# Patient Record
Sex: FEMALE | Race: AMERICAN INDIAN OR ALASKA NATIVE | Employment: OTHER | URBAN - METROPOLITAN AREA
[De-identification: names, ages, dates, MRNs, and addresses within clinical notes are randomized per-mention and may not be internally consistent; named-entity substitution may affect disease eponyms.]

---

## 2022-11-17 LAB
AVERAGE GLUCOSE: NORMAL
HBA1C MFR BLD: 5.4 %

## 2023-01-25 ENCOUNTER — HOSPITAL ENCOUNTER (OUTPATIENT)
Dept: MAMMOGRAPHY | Age: 73
Discharge: HOME | End: 2023-01-28
Payer: MEDICARE

## 2023-01-25 DIAGNOSIS — Z12.31 ENCOUNTER FOR SCREENING MAMMOGRAM FOR BREAST CANCER: ICD-10-CM

## 2023-01-25 DIAGNOSIS — Z78.0 ASYMPTOMATIC MENOPAUSE: ICD-10-CM

## 2023-01-25 PROCEDURE — 77080 DXA BONE DENSITY AXIAL: CPT

## 2023-01-25 PROCEDURE — 77063 BREAST TOMOSYNTHESIS BI: CPT

## 2023-01-27 ENCOUNTER — OFFICE VISIT (OUTPATIENT)
Dept: UROLOGY | Age: 73
End: 2023-01-27

## 2023-01-27 DIAGNOSIS — N39.0 URINARY TRACT INFECTION WITHOUT HEMATURIA, SITE UNSPECIFIED: ICD-10-CM

## 2023-01-27 DIAGNOSIS — R32 INCONTINENCE IN FEMALE: Primary | ICD-10-CM

## 2023-01-27 LAB
BILIRUBIN, URINE, POC: NORMAL
BLOOD URINE, POC: NORMAL
GLUCOSE URINE, POC: NEGATIVE
KETONES, URINE, POC: 15
LEUKOCYTE ESTERASE, URINE, POC: NORMAL
NITRITE, URINE, POC: POSITIVE
PH, URINE, POC: 5.5 (ref 4.6–8)
PROTEIN,URINE, POC: 100
PVR, POC: 122 CC
SPECIFIC GRAVITY, URINE, POC: 1.02 (ref 1–1.03)
URINALYSIS CLARITY, POC: NORMAL
URINALYSIS COLOR, POC: NORMAL
UROBILINOGEN, POC: NORMAL

## 2023-01-27 RX ORDER — AMLODIPINE BESYLATE 5 MG/1
5 TABLET ORAL
COMMUNITY
Start: 2017-12-01

## 2023-01-27 RX ORDER — TIOTROPIUM BROMIDE AND OLODATEROL 3.124; 2.736 UG/1; UG/1
SPRAY, METERED RESPIRATORY (INHALATION)
COMMUNITY
Start: 2022-11-22

## 2023-01-27 RX ORDER — METOPROLOL SUCCINATE 50 MG/1
50 TABLET, EXTENDED RELEASE ORAL
COMMUNITY
Start: 2019-02-16

## 2023-01-27 RX ORDER — OMEPRAZOLE 10 MG/1
10 CAPSULE, DELAYED RELEASE ORAL DAILY
COMMUNITY

## 2023-01-27 RX ORDER — NITROFURANTOIN 25; 75 MG/1; MG/1
100 CAPSULE ORAL 2 TIMES DAILY
Qty: 14 CAPSULE | Refills: 0 | Status: SHIPPED | OUTPATIENT
Start: 2023-01-27 | End: 2023-02-03

## 2023-01-27 RX ORDER — ASPIRIN 81 MG/1
81 TABLET ORAL
COMMUNITY
Start: 2019-05-29

## 2023-01-27 RX ORDER — MULTIVIT-MIN/IRON/FOLIC ACID/K 18-600-40
CAPSULE ORAL
COMMUNITY

## 2023-01-27 RX ORDER — BENAZEPRIL HYDROCHLORIDE 40 MG/1
40 TABLET, FILM COATED ORAL
COMMUNITY
Start: 2017-10-20

## 2023-01-27 RX ORDER — ALLOPURINOL 300 MG/1
300 TABLET ORAL
COMMUNITY
Start: 2018-01-08

## 2023-01-27 RX ORDER — ATORVASTATIN CALCIUM 20 MG/1
20 TABLET, FILM COATED ORAL
COMMUNITY
Start: 2017-12-09

## 2023-01-27 ASSESSMENT — ENCOUNTER SYMPTOMS
RESPIRATORY NEGATIVE: 1
BACK PAIN: 1
EYES NEGATIVE: 1

## 2023-01-27 NOTE — PROGRESS NOTES
[place] [address]  [phone]          Damien Scott  : 1950    Chief Complaint   Patient presents with    Incontinence          HPI     Damien Scott is a 74 y.o. female  Referred by primary care physician due to ongoing urinary leakage. Patient reports over the last couple years her urinary leakage has worsened. She has significant history of neuropathy. The neuropathy is present but there is no known cause for it. She is not a diabetic. She 1 time was using a cane and rolling walker but she is now wheelchair-bound. She is able to stand and transfer but not able to walk long distances. She reports she feels that a lot of the incontinence is due to her immobility to get to the restroom quick enough. She has started doing timed voids hoping that this will help some and she has seen mild improvement. She wears 3-4 thick pads a day. Those are typically wet when she changes them. She denies any significant stress urinary incontinence. She has significant history of 1 pregnancy with vaginal delivery. Denies any significant history of infections. To her knowledge there is no family history of bladder or kidney issues within the family. She is not currently a smoker but she does have significant history of smoking she smoked for a total of 40 years.     Past Medical History:   Diagnosis Date    Heart disease     High cholesterol     Hypertension      Past Surgical History:   Procedure Laterality Date    JOINT REPLACEMENT      OTHER SURGICAL HISTORY      lumbar    TUBAL LIGATION       Current Outpatient Medications   Medication Sig Dispense Refill    metoprolol succinate (TOPROL XL) 50 MG extended release tablet 50 mg      allopurinol (ZYLOPRIM) 300 MG tablet 300 mg      benazepril (LOTENSIN) 40 MG tablet 40 mg      amLODIPine (NORVASC) 5 MG tablet 5 mg      aspirin 81 MG EC tablet 81 mg      atorvastatin (LIPITOR) 20 MG tablet 20 mg      STIOLTO RESPIMAT 2.5-2.5 MCG/ACT AERS       NONFORMULARY Doxazosin 20 mg      omeprazole (PRILOSEC) 10 MG delayed release capsule Take 10 mg by mouth daily      simethicone (MYLICON) 40 MO/0.7WV LIQD drops Take 40 mg by mouth every 6 hours as needed      Multiple Vitamins-Minerals (VISION-NICO PRESERVE PO) Take by mouth      Cholecalciferol (VITAMIN D) 50 MCG (2000 UT) CAPS capsule Take by mouth      nitrofurantoin, macrocrystal-monohydrate, (MACROBID) 100 MG capsule Take 1 capsule by mouth 2 times daily for 7 days 14 capsule 0     No current facility-administered medications for this visit. No Known Allergies  Social History     Socioeconomic History    Marital status:      Spouse name: Not on file    Number of children: Not on file    Years of education: Not on file    Highest education level: Not on file   Occupational History    Not on file   Tobacco Use    Smoking status: Former     Types: Cigarettes    Smokeless tobacco: Never   Substance and Sexual Activity    Alcohol use: Yes    Drug use: Not on file    Sexual activity: Not on file   Other Topics Concern    Not on file   Social History Narrative    Not on file     Social Determinants of Health     Financial Resource Strain: Not on file   Food Insecurity: Not on file   Transportation Needs: Not on file   Physical Activity: Not on file   Stress: Not on file   Social Connections: Not on file   Intimate Partner Violence: Not on file   Housing Stability: Not on file     Family History   Problem Relation Age of Onset    Heart Disease Father     Heart Disease Mother     Breast Cancer Neg Hx        Review of Systems  Constitutional: Positive for fever. Skin: Negative skin ROS  Eyes: Eyes negative  ENT: Positive for dry mouth. Respiratory: Respiratory negative  Cardiovascular: Positive for hypertension, leg pain and varicose veins. GI: Neg GI ROS  Genitourinary: Positive for nocturia, urgency and leakage w/ urge. Number of pregnancies: 1.   Number of births: 1.  Musculoskeletal: Positive for back pain and arthralgias. Neurological: Positive for numbness. Psychological: Neg psych ROS  Endocrine: Endocrine negative  Hem/Lymphatic: Positive for easy bruising. Urinalysis  UA - Dipstick  Results for orders placed or performed in visit on 01/27/23   AMB POC URINALYSIS DIP STICK AUTO W/O MICRO   Result Value Ref Range    Color (UA POC)      Clarity (UA POC)      Glucose, Urine, POC Negative Negative    Bilirubin, Urine, POC Small Negative    KETONES, Urine, POC 15 Negative    Specific Gravity, Urine, POC 1.020 1.001 - 1.035    Blood (UA POC) Moderate Negative    pH, Urine, POC 5.5 4.6 - 8.0    Protein, Urine,  Negative    Urobilinogen, POC 1 mg/dL     Nitrite, Urine, POC Positive Negative    Leukocyte Esterase, Urine, POC Moderate Negative     PHYSICAL EXAM    GENERAL: No acute distress, Awake, Alert, Oriented X 3, Gait normal  ABDOMEN: soft, non tender, non-distended, positive bowel sounds, no organomegaly, no palpable masses, no guarding, no rebound tenderness  SKIN: No rash, no erythema, no lacerations or abrasions, no ecchymosis  MUSCULOSKELETAL - BLE weakness. She is in W/C. PVR- 122ml    Assessment and Plan    ICD-10-CM    1. Incontinence in female  R32 AMB POC URINALYSIS DIP STICK AUTO W/O MICRO     Culture, Urine     AMB POC PVR, REESE,POST-VOID RES,US,NON-IMAGING      2. Urinary tract infection without hematuria, site unspecified  N39.0 Culture, Urine     AMB POC PVR, REESE,POST-VOID RES,US,NON-IMAGING        Urinary urgency/urge incontinence  The urgency is most certainly worse due to physical restraints. She has started doing timed voids. She was given samples of Myrbetriq. Side effect profile of new medication discussed. UTI-  Urine culture sent. She has not ever had a urinary infection to her knowledge. We will add Macrobid BID for 7 days. Follow up in 6 weeks for urine recheck and also to assess symptoms.      Diamond Carrion - NP   Ayush Dubon is supervising physician today and he approves plan of care. Return in about 6 weeks (around 3/10/2023) for for recheck. Elements of this note have been dictated using speech recognition software. Although reviewed, errors of speech recognition may have occurred.

## 2023-01-29 LAB
BACTERIA SPEC CULT: ABNORMAL
BACTERIA SPEC CULT: ABNORMAL
SERVICE CMNT-IMP: ABNORMAL

## 2023-02-01 ENCOUNTER — INITIAL CONSULT (OUTPATIENT)
Dept: CARDIOLOGY CLINIC | Age: 73
End: 2023-02-01
Payer: MEDICARE

## 2023-02-01 VITALS — HEART RATE: 76 BPM | WEIGHT: 154 LBS | SYSTOLIC BLOOD PRESSURE: 148 MMHG | DIASTOLIC BLOOD PRESSURE: 88 MMHG

## 2023-02-01 DIAGNOSIS — M47.819 SPONDYLOARTHROPATHY: ICD-10-CM

## 2023-02-01 DIAGNOSIS — I10 PRIMARY HYPERTENSION: Primary | ICD-10-CM

## 2023-02-01 DIAGNOSIS — I65.29 ASYMPTOMATIC CAROTID ARTERY STENOSIS WITHOUT INFARCTION, UNSPECIFIED LATERALITY: ICD-10-CM

## 2023-02-01 PROCEDURE — 99203 OFFICE O/P NEW LOW 30 MIN: CPT | Performed by: INTERNAL MEDICINE

## 2023-02-01 PROCEDURE — 93000 ELECTROCARDIOGRAM COMPLETE: CPT | Performed by: INTERNAL MEDICINE

## 2023-02-01 PROCEDURE — 3077F SYST BP >= 140 MM HG: CPT | Performed by: INTERNAL MEDICINE

## 2023-02-01 PROCEDURE — 1123F ACP DISCUSS/DSCN MKR DOCD: CPT | Performed by: INTERNAL MEDICINE

## 2023-02-01 PROCEDURE — 3079F DIAST BP 80-89 MM HG: CPT | Performed by: INTERNAL MEDICINE

## 2023-02-01 RX ORDER — GABAPENTIN 100 MG/1
CAPSULE ORAL
COMMUNITY
Start: 2022-11-29 | End: 2023-02-01 | Stop reason: DRUGHIGH

## 2023-02-01 RX ORDER — GABAPENTIN 100 MG/1
200 CAPSULE ORAL 3 TIMES DAILY
COMMUNITY
Start: 2022-11-29 | End: 2023-02-27

## 2023-02-01 NOTE — PROGRESS NOTES
[facility] CARDIOLOGY  [address], [address]  PHONE: [phone]        23        NAME:  Silvana Yun  : 1950  MRN: [id number]     CHIEF COMPLAINT:    Hypertension      SUBJECTIVE:     73 yo female referred by IM for fu HTN. BP generally well controlled. BP at home 130/70 range. Phx:  Htn  Lumbar sx   Neuropathy  MDS, low risk  Copd  Hx of annual carotid ultrasound      Medications were all reviewed with the patient today and updated as necessary. Current Outpatient Medications   Medication Sig    gabapentin (NEURONTIN) 100 MG capsule Take 200 mg by mouth 3 times daily. Cyanocobalamin (B-12 PO) Take by mouth    NIACIN-50 PO Take by mouth    metoprolol succinate (TOPROL XL) 50 MG extended release tablet 50 mg    allopurinol (ZYLOPRIM) 300 MG tablet 300 mg    benazepril (LOTENSIN) 40 MG tablet 40 mg    amLODIPine (NORVASC) 5 MG tablet 5 mg    aspirin 81 MG EC tablet 81 mg    atorvastatin (LIPITOR) 20 MG tablet 20 mg    STIOLTO RESPIMAT 2.5-2.5 MCG/ACT AERS     NONFORMULARY Doxazosin 20 mg    omeprazole (PRILOSEC) 10 MG delayed release capsule Take 10 mg by mouth daily    Multiple Vitamins-Minerals (VISION-NICO PRESERVE PO) Take by mouth    Cholecalciferol (VITAMIN D) 50 MCG ( UT) CAPS capsule Take by mouth    nitrofurantoin, macrocrystal-monohydrate, (MACROBID) 100 MG capsule Take 1 capsule by mouth 2 times daily for 7 days     No current facility-administered medications for this visit. Allergies   Allergen Reactions    Sulfa Antibiotics Other (See Comments)     Other reaction(s): GI Intolerance, Nausea and/or vomiting-Intolerance  GI UPSET  GI UPSET             PHYSICAL EXAM:     Wt Readings from Last 3 Encounters:   23 154 lb (69.9 kg)     BP Readings from Last 3 Encounters:   23 (!) 148/88       BP (!) 148/88   Pulse 76   Wt 154 lb (69.9 kg)     Physical Exam  Vitals reviewed. HENT:      Head: Normocephalic and atraumatic. Eyes:      Extraocular Movements: Extraocular movements intact. Pupils: Pupils are equal, round, and reactive to light. Cardiovascular:      Rate and Rhythm: Normal rate. Heart sounds: Normal heart sounds. Pulmonary:      Effort: Pulmonary effort is normal.      Breath sounds: Normal breath sounds. Abdominal:      General: Abdomen is flat. Palpations: Abdomen is soft. There is no mass. Musculoskeletal:         General: Normal range of motion. Cervical back: Normal range of motion. Skin:     General: Skin is warm and dry. Neurological:      General: No focal deficit present. Mental Status: She is alert and oriented to person, place, and time. Psychiatric:         Mood and Affect: Mood normal.         RECENT LABS AND RECORDS REVIEW    Sinus  Rhythm   Low voltage in precordial leads. ASSESSMENT and PLAN    Mari France was seen today for hypertension. Diagnoses and all orders for this visit:    Primary hypertension    Spondyloarthropathy    Asymptomatic carotid artery stenosis without infarction, unspecified laterality    Other orders  -     EKG 12 lead     /////    CV status is stable. Medications and most recent labs reviewed. Greater  than 50% of today's visit was devoted to counseling the patient, explaining disease concepts and offering advice and suggestions for optimal care. Return in about 1 year (around 2/1/2024).        Kait Manjarrez MD  2/1/2023  12:02 PM .

## 2023-03-10 ENCOUNTER — OFFICE VISIT (OUTPATIENT)
Dept: UROLOGY | Age: 73
End: 2023-03-10
Payer: MEDICARE

## 2023-03-10 DIAGNOSIS — K62.3 RECTAL PROLAPSE: Primary | ICD-10-CM

## 2023-03-10 DIAGNOSIS — N39.0 URINARY TRACT INFECTION WITHOUT HEMATURIA, SITE UNSPECIFIED: ICD-10-CM

## 2023-03-10 DIAGNOSIS — R32 INCONTINENCE IN FEMALE: ICD-10-CM

## 2023-03-10 PROCEDURE — 99214 OFFICE O/P EST MOD 30 MIN: CPT | Performed by: NURSE PRACTITIONER

## 2023-03-10 PROCEDURE — 1123F ACP DISCUSS/DSCN MKR DOCD: CPT | Performed by: NURSE PRACTITIONER

## 2023-03-10 NOTE — PROGRESS NOTES
[address], [address]  [phone]          Alejandro Mcbride  : 1950    Chief Complaint   Patient presents with    Follow-up          HPI     Alejandro Mcbride is a 74 y.o. female    Returns today for follow-up on urinary urgency, urge incontinence and UTI. At her last visit urine culture revealed Klebsiella. She was treated with antibiotic and the infection cleared easily. She was also given Myrbetriq 50 mg p.o. daily for the urinary urgency and incontinence. Patient tells me she has seen 90% improvement with medication. She wears 1 pad daily. She wears this mainly because of rectal prolapse and leakage. She is not having any urinary leakage. She does ask if she could be referred to colorectal for them to take a look at the rectal issues. Past Medical History:   Diagnosis Date    Heart disease     High cholesterol     Hypertension      Past Surgical History:   Procedure Laterality Date    JOINT REPLACEMENT      OTHER SURGICAL HISTORY      lumbar    TUBAL LIGATION       Current Outpatient Medications   Medication Sig Dispense Refill    mirabegron (MYRBETRIQ) 50 MG TB24 Take 50 mg by mouth daily 90 tablet 3    Cyanocobalamin (B-12 PO) Take by mouth      NIACIN-50 PO Take by mouth      metoprolol succinate (TOPROL XL) 50 MG extended release tablet 50 mg      allopurinol (ZYLOPRIM) 300 MG tablet 300 mg      benazepril (LOTENSIN) 40 MG tablet 40 mg      amLODIPine (NORVASC) 5 MG tablet 5 mg      aspirin 81 MG EC tablet 81 mg      atorvastatin (LIPITOR) 20 MG tablet 20 mg      STIOLTO RESPIMAT 2.5-2.5 MCG/ACT AERS       NONFORMULARY Doxazosin 20 mg      omeprazole (PRILOSEC) 10 MG delayed release capsule Take 10 mg by mouth daily      Multiple Vitamins-Minerals (VISION-NICO PRESERVE PO) Take by mouth      Cholecalciferol (VITAMIN D) 50 MCG (2000) CAPS capsule Take by mouth      gabapentin (NEURONTIN) 100 MG capsule Take 200 mg by mouth 3 times daily. No current facility-administered medications for this visit. Allergies   Allergen Reactions    Sulfa Antibiotics Other (See Comments)     Other reaction(s): GI Intolerance, Nausea and/or vomiting-Intolerance  GI UPSET  GI UPSET       Social History     Socioeconomic History    Marital status:      Spouse name: Not on file    Number of children: Not on file    Years of education: Not on file    Highest education level: Not on file   Occupational History    Not on file   Tobacco Use    Smoking status: Former     Types: Cigarettes    Smokeless tobacco: Never   Substance and Sexual Activity    Alcohol use: Yes    Drug use: Not on file    Sexual activity: Not on file   Other Topics Concern    Not on file   Social History Narrative    Not on file     Social Determinants of Health     Financial Resource Strain: Not on file   Food Insecurity: Not on file   Transportation Needs: Not on file   Physical Activity: Not on file   Stress: Not on file   Social Connections: Not on file   Intimate Partner Violence: Not on file   Housing Stability: Not on file     Family History   Problem Relation Age of Onset    Heart Disease Father     Heart Disease Mother     Breast Cancer Neg Hx        ROS  See HPI      GENERAL: No acute distress, Awake, Alert, Oriented X 3, Gait normal  ABDOMEN: soft, non tender, non-distended, positive bowel sounds, no organomegaly, no palpable masses, no guarding, no rebound tenderness  SKIN: No rash, no erythema, no lacerations or abrasions, no ecchymosis  MUSCULOSKELETAL - BLE weakness. She is in W/C. Assessment and Plan    ICD-10-CM    1. Rectal prolapse  K62.3 External Referral To Colorectal Surgery      2. Incontinence in female  R32       3. Urinary tract infection without hematuria, site unspecified  N39.0         UTI-  Resolved    Incontinence-  Well controlled with Myrbetriq 50 mg p.o. daily. A prescription will be [facility] to [facility]. I will see her back in 6 months.     Rectal prolapse-  A referral to [facility] has been arranged. JOSE LUIS Ross - NP  Dr. Alise Hernandez is supervising physician today and he approves plan of care. Return in about 6 months (around 9/10/2023) for for recheck. Elements of this note have been dictated using speech recognition software. Although reviewed, errors of speech recognition may have occurred. No

## 2023-09-13 ENCOUNTER — OFFICE VISIT (OUTPATIENT)
Dept: UROLOGY | Age: 73
End: 2023-09-13
Payer: MEDICARE

## 2023-09-13 DIAGNOSIS — N39.0 URINARY TRACT INFECTION WITHOUT HEMATURIA, SITE UNSPECIFIED: ICD-10-CM

## 2023-09-13 DIAGNOSIS — R32 INCONTINENCE IN FEMALE: Primary | ICD-10-CM

## 2023-09-13 LAB
BILIRUBIN, URINE, POC: NEGATIVE
BLOOD URINE, POC: NEGATIVE
GLUCOSE URINE, POC: NEGATIVE
KETONES, URINE, POC: NORMAL
LEUKOCYTE ESTERASE, URINE, POC: NEGATIVE
NITRITE, URINE, POC: NEGATIVE
PH, URINE, POC: 5 (ref 4.6–8)
PROTEIN,URINE, POC: 30
SPECIFIC GRAVITY, URINE, POC: 1.02 (ref 1–1.03)
URINALYSIS CLARITY, POC: NORMAL
URINALYSIS COLOR, POC: NORMAL
UROBILINOGEN, POC: NORMAL

## 2023-09-13 PROCEDURE — 81003 URINALYSIS AUTO W/O SCOPE: CPT | Performed by: NURSE PRACTITIONER

## 2023-09-13 PROCEDURE — 99213 OFFICE O/P EST LOW 20 MIN: CPT | Performed by: NURSE PRACTITIONER

## 2023-09-13 PROCEDURE — 1123F ACP DISCUSS/DSCN MKR DOCD: CPT | Performed by: NURSE PRACTITIONER

## 2023-09-13 ASSESSMENT — ENCOUNTER SYMPTOMS
BACK PAIN: 0
NAUSEA: 0

## 2023-09-13 NOTE — PROGRESS NOTES
[address]  [phone]          Malachi Krishnamurthy  : 1950    Chief Complaint   Patient presents with    Follow-up          HPI     Malachi Krishnamurthy is a 74 y.o. female  Returns today for follow-up on urinary urgency, urge incontinence and UTI. She has not had any urinary infections since I saw her last.  She has not needed an antibiotic. As far as urinary frequency urgency are concerned she continues to use Myrbetriq 50 mg daily. She has seen much much improvement with this medication. She actually traveled to [place] and her flight was 5 hours. She did not have to use the restroom on flight. She was thrilled with this. She apparently is in the donut hole and had to pay $400 for the Myrbetriq. I advised her to please call me if this happens so that I can leave her samples upfront. She wears 1 pad daily. She wears this mainly because of rectal prolapse and leakage. She was checked by colorectal since I saw her last and it does not appear that she has a severe prolapse and this is not an issue. She saw Dr. Sabina Hay.     Past Medical History:   Diagnosis Date    Heart disease     High cholesterol     Hypertension      Past Surgical History:   Procedure Laterality Date    JOINT REPLACEMENT      OTHER SURGICAL HISTORY      lumbar    TUBAL LIGATION       Current Outpatient Medications   Medication Sig Dispense Refill    mirabegron (MYRBETRIQ) 50 MG TB24 Take 50 mg by mouth daily 90 tablet 3    Cyanocobalamin (B-12 PO) Take by mouth      metoprolol succinate (TOPROL XL) 50 MG extended release tablet 1 tablet      benazepril (LOTENSIN) 40 MG tablet 1 tablet      amLODIPine (NORVASC) 5 MG tablet 1 tablet      aspirin 81 MG EC tablet 1 tablet      atorvastatin (LIPITOR) 20 MG tablet 1 tablet      STIOLTO RESPIMAT 2.5-2.5 MCG/ACT AERS       NONFORMULARY Doxazosin 20 mg      omeprazole (PRILOSEC) 10 MG delayed release capsule Take 1 capsule by mouth daily

## 2023-10-30 RX ORDER — MIRABEGRON 50 MG/1
50 TABLET, FILM COATED, EXTENDED RELEASE ORAL DAILY
Qty: 100 TABLET | Refills: 2 | Status: SHIPPED | OUTPATIENT
Start: 2023-10-30

## 2024-03-25 ENCOUNTER — OFFICE VISIT (OUTPATIENT)
Dept: UROLOGY | Age: 74
End: 2024-03-25
Payer: MEDICARE

## 2024-03-25 DIAGNOSIS — R32 INCONTINENCE IN FEMALE: Primary | ICD-10-CM

## 2024-03-25 DIAGNOSIS — N39.0 URINARY TRACT INFECTION WITHOUT HEMATURIA, SITE UNSPECIFIED: ICD-10-CM

## 2024-03-25 PROCEDURE — G8421 BMI NOT CALCULATED: HCPCS | Performed by: NURSE PRACTITIONER

## 2024-03-25 PROCEDURE — 4004F PT TOBACCO SCREEN RCVD TLK: CPT | Performed by: NURSE PRACTITIONER

## 2024-03-25 PROCEDURE — 1123F ACP DISCUSS/DSCN MKR DOCD: CPT | Performed by: NURSE PRACTITIONER

## 2024-03-25 PROCEDURE — 99213 OFFICE O/P EST LOW 20 MIN: CPT | Performed by: NURSE PRACTITIONER

## 2024-03-25 PROCEDURE — 1090F PRES/ABSN URINE INCON ASSESS: CPT | Performed by: NURSE PRACTITIONER

## 2024-03-25 PROCEDURE — G8428 CUR MEDS NOT DOCUMENT: HCPCS | Performed by: NURSE PRACTITIONER

## 2024-03-25 PROCEDURE — G8399 PT W/DXA RESULTS DOCUMENT: HCPCS | Performed by: NURSE PRACTITIONER

## 2024-03-25 PROCEDURE — G8484 FLU IMMUNIZE NO ADMIN: HCPCS | Performed by: NURSE PRACTITIONER

## 2024-03-25 PROCEDURE — 0509F URINE INCON PLAN DOCD: CPT | Performed by: NURSE PRACTITIONER

## 2024-03-25 ASSESSMENT — ENCOUNTER SYMPTOMS
NAUSEA: 0
BACK PAIN: 0

## 2024-03-25 NOTE — PROGRESS NOTES
[facility]  [address] [place] [id number]  [phone]          Lidia Mora  : 1950    Chief Complaint   Patient presents with    Follow-up          HPI     Lidia Mora is a 74 y.o. female here today for follow-up 6 months on urinary urgency, incontinence and history of UTIs.  When I saw her last patient was not having any issues whatsoever with infections.  She is currently not taking any antibiotic therapy.  Overall she is doing well.    As far as her urinary urgency and urge incontinence she continues to use Myrbetriq 50 mg daily.  This is controlling her urinary symptoms.  She will wear at least 1 pad a day just for protection.  That is rarely wet.  She also wears this secondary to rectal prolapse and leakage.  The prolapse itself has been evaluated by colorectal and it was felt that no treatment was needed at this time.    She was unable to give me a urine today, however she is asymptomatic.          Past Medical History:   Diagnosis Date    Heart disease     High cholesterol     Hypertension      Past Surgical History:   Procedure Laterality Date    JOINT REPLACEMENT      OTHER SURGICAL HISTORY      lumbar    TUBAL LIGATION       Current Outpatient Medications   Medication Sig Dispense Refill    mirabegron (MYRBETRIQ) 50 MG TB24 Take 50 mg by mouth daily 90 tablet 3    Cyanocobalamin (B-12 PO) Take by mouth      metoprolol succinate (TOPROL XL) 50 MG extended release tablet 1 tablet      benazepril (LOTENSIN) 40 MG tablet 1 tablet      amLODIPine (NORVASC) 5 MG tablet 1 tablet      aspirin 81 MG EC tablet 1 tablet      atorvastatin (LIPITOR) 20 MG tablet 1 tablet      STIOLTO RESPIMAT 2.5-2.5 MCG/ACT AERS       NONFORMULARY Doxazosin 20 mg      omeprazole (PRILOSEC) 10 MG delayed release capsule Take 1 capsule by mouth daily      Multiple Vitamins-Minerals (VISION-NICO PRESERVE PO) Take by mouth      Cholecalciferol (VITAMIN D) 50 MCG (2000) CAPS capsule Take by mouth

## 2024-07-24 RX ORDER — MIRABEGRON 50 MG/1
50 TABLET, EXTENDED RELEASE ORAL DAILY
Qty: 90 TABLET | Refills: 0 | Status: SHIPPED | OUTPATIENT
Start: 2024-07-24

## 2024-10-13 RX ORDER — MIRABEGRON 50 MG/1
50 TABLET, FILM COATED, EXTENDED RELEASE ORAL DAILY
Qty: 90 TABLET | Refills: 3 | Status: SHIPPED | OUTPATIENT
Start: 2024-10-13

## 2024-10-22 ENCOUNTER — NURSE ONLY (OUTPATIENT)
Dept: UROLOGY | Age: 74
End: 2024-10-22
Payer: MEDICARE

## 2024-10-22 DIAGNOSIS — N39.0 URINARY TRACT INFECTION WITHOUT HEMATURIA, SITE UNSPECIFIED: Primary | ICD-10-CM

## 2024-10-22 LAB
BILIRUBIN, URINE, POC: NEGATIVE
BLOOD URINE, POC: NORMAL
GLUCOSE URINE, POC: NEGATIVE MG/DL
KETONES, URINE, POC: NEGATIVE MG/DL
LEUKOCYTE ESTERASE, URINE, POC: NORMAL
NITRITE, URINE, POC: NEGATIVE
PH, URINE, POC: 5.5 (ref 4.6–8)
PROTEIN,URINE, POC: NEGATIVE MG/DL
SPECIFIC GRAVITY, URINE, POC: 1 (ref 1–1.03)
URINALYSIS CLARITY, POC: NORMAL
URINALYSIS COLOR, POC: NORMAL
UROBILINOGEN, POC: NORMAL MG/DL

## 2024-10-22 PROCEDURE — 81003 URINALYSIS AUTO W/O SCOPE: CPT | Performed by: NURSE PRACTITIONER

## 2024-10-22 PROCEDURE — 99211 OFF/OP EST MAY X REQ PHY/QHP: CPT | Performed by: NURSE PRACTITIONER

## 2024-10-22 RX ORDER — CIPROFLOXACIN 500 MG/1
500 TABLET, FILM COATED ORAL 2 TIMES DAILY
Qty: 14 TABLET | Refills: 0 | Status: SHIPPED | OUTPATIENT
Start: 2024-10-22 | End: 2024-10-29

## 2024-10-22 NOTE — PROGRESS NOTES
UA - Dipstick  Results for orders placed or performed in visit on 10/22/24   AMB POC URINALYSIS DIP STICK AUTO W/O MICRO   Result Value Ref Range    Color (UA POC)      Clarity (UA POC)      Glucose, Urine, POC Negative Negative mg/dL    Bilirubin, Urine, POC Negative Negative    KETONES, Urine, POC Negative Negative mg/dL    Specific Gravity, Urine, POC 1.005 1.001 - 1.035    Blood (UA POC) Trace-intact     pH, Urine, POC 5.5 4.6 - 8.0    Protein, Urine, POC Negative Negative mg/dL    Urobilinogen, POC 0.2 mg/dL <1.1 mg/dL    Nitrite, Urine, POC Negative Negative    Leukocyte Esterase, Urine, POC Moderate Negative         Requested Prescriptions      No prescriptions requested or ordered in this encounter     Plan    Diagnosis Orders   1. Urinary tract infection without hematuria, site unspecified  AMB POC URINALYSIS DIP STICK AUTO W/O MICRO        PLAN:  Culture the urine  Cipro was sent to pharmacy.

## 2024-10-24 ENCOUNTER — TELEPHONE (OUTPATIENT)
Dept: PRIMARY CARE CLINIC | Facility: CLINIC | Age: 74
End: 2024-10-24

## 2025-01-23 ENCOUNTER — HOSPITAL ENCOUNTER (OUTPATIENT)
Dept: MAMMOGRAPHY | Age: 75
Discharge: HOME | End: 2025-01-26
Payer: MEDICARE

## 2025-01-23 DIAGNOSIS — Z78.0 ASYMPTOMATIC POSTMENOPAUSAL STATE: ICD-10-CM

## 2025-01-23 PROCEDURE — 77080 DXA BONE DENSITY AXIAL: CPT
